# Patient Record
Sex: FEMALE | Race: WHITE | Employment: FULL TIME | URBAN - METROPOLITAN AREA
[De-identification: names, ages, dates, MRNs, and addresses within clinical notes are randomized per-mention and may not be internally consistent; named-entity substitution may affect disease eponyms.]

---

## 2024-07-02 ENCOUNTER — OFFICE VISIT (OUTPATIENT)
Dept: FAMILY MEDICINE CLINIC | Age: 44
End: 2024-07-02
Payer: COMMERCIAL

## 2024-07-02 VITALS
DIASTOLIC BLOOD PRESSURE: 110 MMHG | SYSTOLIC BLOOD PRESSURE: 190 MMHG | HEIGHT: 64 IN | WEIGHT: 222 LBS | BODY MASS INDEX: 37.9 KG/M2

## 2024-07-02 DIAGNOSIS — N83.201 CYST OF RIGHT OVARY: ICD-10-CM

## 2024-07-02 DIAGNOSIS — I10 HYPERTENSION, UNCONTROLLED: Primary | ICD-10-CM

## 2024-07-02 PROCEDURE — 3077F SYST BP >= 140 MM HG: CPT | Performed by: FAMILY MEDICINE

## 2024-07-02 PROCEDURE — 99203 OFFICE O/P NEW LOW 30 MIN: CPT | Performed by: FAMILY MEDICINE

## 2024-07-02 PROCEDURE — 3080F DIAST BP >= 90 MM HG: CPT | Performed by: FAMILY MEDICINE

## 2024-07-02 RX ORDER — HYDRALAZINE HYDROCHLORIDE 25 MG/1
TABLET, FILM COATED ORAL
Qty: 60 TABLET | Refills: 1 | Status: SHIPPED | OUTPATIENT
Start: 2024-07-02

## 2024-07-02 RX ORDER — IBUPROFEN 600 MG/1
TABLET ORAL
COMMUNITY
Start: 2024-06-28

## 2024-07-02 RX ORDER — AMLODIPINE AND OLMESARTAN MEDOXOMIL 5; 20 MG/1; MG/1
TABLET ORAL
Qty: 30 TABLET | Refills: 0 | Status: SHIPPED | OUTPATIENT
Start: 2024-07-02

## 2024-07-02 RX ORDER — METOPROLOL SUCCINATE 50 MG/1
TABLET, EXTENDED RELEASE ORAL
COMMUNITY
Start: 2024-06-28

## 2024-07-02 SDOH — ECONOMIC STABILITY: HOUSING INSECURITY
IN THE LAST 12 MONTHS, WAS THERE A TIME WHEN YOU DID NOT HAVE A STEADY PLACE TO SLEEP OR SLEPT IN A SHELTER (INCLUDING NOW)?: PATIENT DECLINED

## 2024-07-02 SDOH — ECONOMIC STABILITY: INCOME INSECURITY: HOW HARD IS IT FOR YOU TO PAY FOR THE VERY BASICS LIKE FOOD, HOUSING, MEDICAL CARE, AND HEATING?: PATIENT DECLINED

## 2024-07-02 SDOH — ECONOMIC STABILITY: FOOD INSECURITY: WITHIN THE PAST 12 MONTHS, THE FOOD YOU BOUGHT JUST DIDN'T LAST AND YOU DIDN'T HAVE MONEY TO GET MORE.: PATIENT DECLINED

## 2024-07-02 SDOH — ECONOMIC STABILITY: FOOD INSECURITY: WITHIN THE PAST 12 MONTHS, YOU WORRIED THAT YOUR FOOD WOULD RUN OUT BEFORE YOU GOT MONEY TO BUY MORE.: PATIENT DECLINED

## 2024-07-02 ASSESSMENT — PATIENT HEALTH QUESTIONNAIRE - PHQ9
SUM OF ALL RESPONSES TO PHQ QUESTIONS 1-9: 0
1. LITTLE INTEREST OR PLEASURE IN DOING THINGS: NOT AT ALL
2. FEELING DOWN, DEPRESSED OR HOPELESS: NOT AT ALL
SUM OF ALL RESPONSES TO PHQ QUESTIONS 1-9: 0
SUM OF ALL RESPONSES TO PHQ QUESTIONS 1-9: 0
SUM OF ALL RESPONSES TO PHQ9 QUESTIONS 1 & 2: 0
SUM OF ALL RESPONSES TO PHQ QUESTIONS 1-9: 0

## 2024-07-02 NOTE — PATIENT INSTRUCTIONS

## 2024-07-02 NOTE — PROGRESS NOTES
[facility] Medicine  Clinic Note    Date: 7/2/2024                                               Subjective:     Chief Complaint   Patient presents with    New Patient     NEW PT ESTABLISH CARE, ELEVATED BLOOD PRESSURE RIGHT OVARY AND TUBE REMOVED AND DNC AND GRAPEFRUIT SIZE TUMOR REMOVED 3 DAYS AGO AT [facility]      HPI  Had surgery end of last week  210/120 during surgery - given 2 doses labetolol  180's /100 - given oral metoprolol and pain medicine - told to monitor - on toprol xl 50 daily and feels awful  Given hydralazine and another medicine  Bp was a little high  Makes pulse go down to 50's  No prn bp meds  No headaches/ blurred vision - has felt dizzy couple times.  Took out right ovary, cyst and tube - did D and C.  A lot of pain but using oxycodone - didn't need yesterday - ibuprofen/ tylenol combination   No chest pain/ palpitations  No fam hx of htn.  Trapped at home but / nurse checking bp frequently.  Has home bp monitor.  Off toprol.  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  No fluid retention  Pain getting better each day - walking some.  No fam hx of htn  Gravelly voice for couple days after surgery.  A1c 4.3  Got nausea after surgery  1 cup coffee in am - no energy drinks for 4-5 months.  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    Past Medical History:   Diagnosis Date    Endometriosis     PCOS (polycystic ovarian syndrome)      Past Surgical History:   Procedure

## 2024-07-09 RX ORDER — AMLODIPINE AND OLMESARTAN MEDOXOMIL 5; 20 MG/1; MG/1
TABLET ORAL
Qty: 180 TABLET | Refills: 0 | Status: SHIPPED | OUTPATIENT
Start: 2024-07-09